# Patient Record
Sex: MALE | Race: WHITE | NOT HISPANIC OR LATINO | URBAN - NONMETROPOLITAN AREA
[De-identification: names, ages, dates, MRNs, and addresses within clinical notes are randomized per-mention and may not be internally consistent; named-entity substitution may affect disease eponyms.]

---

## 2019-01-10 ENCOUNTER — OFFICE VISIT (OUTPATIENT)
Dept: CARDIOLOGY | Facility: CLINIC | Age: 69
End: 2019-01-10

## 2019-01-10 VITALS
HEART RATE: 77 BPM | OXYGEN SATURATION: 96 % | SYSTOLIC BLOOD PRESSURE: 120 MMHG | BODY MASS INDEX: 29.69 KG/M2 | DIASTOLIC BLOOD PRESSURE: 60 MMHG | HEIGHT: 73 IN | WEIGHT: 224 LBS

## 2019-01-10 DIAGNOSIS — I25.119 CORONARY ARTERY DISEASE INVOLVING NATIVE CORONARY ARTERY OF NATIVE HEART WITH ANGINA PECTORIS (HCC): ICD-10-CM

## 2019-01-10 DIAGNOSIS — R53.81 PHYSICAL DECONDITIONING: ICD-10-CM

## 2019-01-10 DIAGNOSIS — R06.09 DYSPNEA ON EXERTION: Primary | ICD-10-CM

## 2019-01-10 DIAGNOSIS — E78.5 DYSLIPIDEMIA: ICD-10-CM

## 2019-01-10 PROBLEM — I25.10 CAD (CORONARY ARTERY DISEASE): Status: ACTIVE | Noted: 2019-01-10

## 2019-01-10 PROCEDURE — 93000 ELECTROCARDIOGRAM COMPLETE: CPT | Performed by: INTERNAL MEDICINE

## 2019-01-10 PROCEDURE — 99204 OFFICE O/P NEW MOD 45 MIN: CPT | Performed by: INTERNAL MEDICINE

## 2019-01-10 NOTE — PROGRESS NOTES
Reason for Visit: Dyspnea on exertion..    HPI:  Jun Carey is a 68 y.o. male is being seen for consultation today at the request of Osmani Richardson MD.  He has previously followed with 2 different cardiologist, Dr Felder in [place] and Dr Zamudio in [place], [place].  Unfortunately none of his previous cardiac records are available today.  He notes have progressive dyspnea on exertion.  It has worsened since the weather has gotten cold.  He has not had any recent testing by Dr Richardson.  He denies any chest pain, palpitations, dizziness, syncope, PND, or orthopnea.  He does not do any exercise due to his arthritis.  He reports having a heart catheterization in the past.  He thinks this was about 2005.  He reports finding a blockage in his RCA that they were unable to stent due to tortuosity in the vessel and required medical management.    Previous Cardiac Testing and Procedures:  - None available    Patient Active Problem List   Diagnosis   • CAD (coronary artery disease)   • Dyslipidemia       Social History     Tobacco Use   • Smoking status: Never Smoker   • Smokeless tobacco: Former User     Types: Chew   Substance Use Topics   • Alcohol use: No     Frequency: Never   • Drug use: Not on file       History reviewed. No pertinent family history.    The following portions of the patient's history were reviewed and updated as appropriate: allergies, current medications, past family history, past medical history, past social history, past surgical history and problem list.      Current Outpatient Medications:   •  aspirin  MG tablet, Take 325 mg by mouth Every 6 (Six) Hours As Needed., Disp: , Rfl:   •  nebivolol (BYSTOLIC) 5 MG tablet, Take 5 mg by mouth Daily., Disp: , Rfl:   •  oxyCODONE-acetaminophen (PERCOCET)  MG per tablet, Take 1 tablet by mouth Every 6 (Six) Hours As Needed for Moderate Pain ., Disp: , Rfl:   •  pramipexole (MIRAPEX) 0.125 MG tablet, Take 0.125 mg by mouth 3 (Three) Times a  "Day., Disp: , Rfl:   •  simvastatin (ZOCOR) 20 MG tablet, Take 20 mg by mouth Every Night., Disp: , Rfl:   •  Testosterone Cypionate (TESTONE CIK) 200 MG/ML kit, Inject 1 mcL/mL into the appropriate muscle as directed by prescriber., Disp: , Rfl:   •  traZODone (DESYREL) 100 MG tablet, Take 100 mg by mouth Every Night., Disp: , Rfl:     Review of Systems   Constitution: Positive for weakness and malaise/fatigue. Negative for chills, fever and weight loss.   HENT: Negative for sore throat.    Eyes: Negative for blurred vision and visual disturbance.   Cardiovascular: Positive for dyspnea on exertion. Negative for chest pain, leg swelling, palpitations, paroxysmal nocturnal dyspnea and syncope.   Respiratory: Positive for shortness of breath. Negative for cough.    Endocrine: Negative for cold intolerance and polyuria.   Skin: Negative for itching and rash.   Musculoskeletal: Positive for joint pain. Negative for joint swelling and myalgias.   Gastrointestinal: Negative for abdominal pain, diarrhea and vomiting.   Genitourinary: Negative for dysuria and hematuria.   Neurological: Negative for dizziness, headaches and numbness.   Psychiatric/Behavioral: Negative for depression. The patient is not nervous/anxious.    Allergic/Immunologic: Negative for hives.       Objective   /60 (BP Location: Left arm, Patient Position: Sitting, Cuff Size: Adult)   Pulse 77   Ht 185.4 cm (73\")   Wt 102 kg (224 lb)   SpO2 96%   BMI 29.55 kg/m²   Physical Exam   Constitutional: He is oriented to person, place, and time. He appears well-developed and well-nourished.   HENT:   Head: Normocephalic and atraumatic.   Eyes: Conjunctivae and EOM are normal. Pupils are equal, round, and reactive to light.   Neck: Normal range of motion. Neck supple. No JVD present. No thyromegaly present.   Cardiovascular: Normal rate, regular rhythm and normal heart sounds.   No murmur heard.  Pulmonary/Chest: Effort normal and breath sounds normal. " He has no wheezes. He has no rales.   Abdominal: Soft. Bowel sounds are normal. He exhibits no distension. There is no tenderness.   Musculoskeletal: Normal range of motion. He exhibits no edema.   Neurological: He is alert and oriented to person, place, and time. Coordination normal.   Skin: Skin is warm and dry. No rash noted.   Psychiatric: He has a normal mood and affect. His behavior is normal.       ECG 12 Lead  Date/Time: 1/10/2019 12:11 PM  Performed by: Donny Stewart MD  Authorized by: Donny Stewart MD   Previous ECG: no previous ECG available  Rhythm: sinus rhythm  Rate: normal  Clinical impression: normal ECG              ICD-10-CM ICD-9-CM   1. Dyspnea on exertion R06.09 786.09   2. Coronary artery disease involving native coronary artery of native heart with angina pectoris (CMS/[facility]) I25.119 414.01     413.9   3. Dyslipidemia E78.5 272.4   4. Physical deconditioning R53.81 799.3         Assessment/Plan:  1.  Dyspnea on exertion: Unclear etiology.  His sedentary due to his arthritis deconditioning is likely playing a role.  Reports history of coronary artery disease and required medical management.  Unfortunately none of his recent cardiac records are available.  We will attempt obtain copy of these.      2.  Coronary artery disease: Reports history of stenosis in his RCA that was unable to be stented due to tortuosity.  We will obtain previous cardiac records.  Continue aspirin, Bystolic, and simvastatin.  Given his dyspnea on exertion may consider a stress test once a.  Cardiac records are reviewed.    3.  Dyslipidemia: Managed on simvastatin.    4.  Deconditioning:  Unable to exercise due to arthritis.  Likely contributing to shortness of breath.

## 2019-03-14 ENCOUNTER — OFFICE VISIT (OUTPATIENT)
Dept: CARDIOLOGY | Facility: CLINIC | Age: 69
End: 2019-03-14

## 2019-03-14 VITALS
HEART RATE: 97 BPM | DIASTOLIC BLOOD PRESSURE: 60 MMHG | BODY MASS INDEX: 29.42 KG/M2 | WEIGHT: 222 LBS | HEIGHT: 73 IN | OXYGEN SATURATION: 97 % | SYSTOLIC BLOOD PRESSURE: 148 MMHG

## 2019-03-14 DIAGNOSIS — R06.09 DYSPNEA ON EXERTION: Primary | ICD-10-CM

## 2019-03-14 DIAGNOSIS — R53.81 PHYSICAL DECONDITIONING: ICD-10-CM

## 2019-03-14 DIAGNOSIS — I25.119 CORONARY ARTERY DISEASE INVOLVING NATIVE CORONARY ARTERY OF NATIVE HEART WITH ANGINA PECTORIS (HCC): ICD-10-CM

## 2019-03-14 DIAGNOSIS — E78.5 DYSLIPIDEMIA: ICD-10-CM

## 2019-03-14 PROCEDURE — 99214 OFFICE O/P EST MOD 30 MIN: CPT | Performed by: INTERNAL MEDICINE

## 2019-03-14 NOTE — PROGRESS NOTES
Reason for Visit: cardiovascular follow up.    HPI:  Jun Carey is a 68 y.o. male is here today for follow-up.  He was previously seen in January for dyspnea on exertion.  None of his previous cardiac testing were available at that time.  These have been obtained and reviewed.  He had a coronary calcium score in 2004 that was 0.  He had a heart catheterization in 2005 that demonstrated a lesion in the proximal RCA that was felt to be difficult to intervene upon and medical therapy was recommended.  He subsequently had a negative nuclear stress test in 2013 and a relatively normal 2D echocardiogram in 2013 as well.  He reports his dyspnea on exertion has progressed in the last 2-3 years.  He continues no he is not very active due to the arthritis as well as the weather this winter.  He denies any chest pain, palpitations, dizziness, syncope, PND, or orthopnea.      Previous Cardiac Testing and Procedures:  - Coronary calcium score (12/23/2004) 0  - LHC (2/8/2005) 80% narrowing of the proximal RCA, medical therapy recommended has not felt to be contributing to her dyspnea  - Echo (7/25/2013) EF 60%, normal systolic function, mild LVH, normal diastolic function, normal LA, RA, and RV size and function, mild TR and PI  - Nuclear stress (7/29/2013) negative for ischemia, EF 59%      Patient Active Problem List   Diagnosis   • CAD (coronary artery disease)   • Dyslipidemia       Social History     Tobacco Use   • Smoking status: Never Smoker   • Smokeless tobacco: Former User     Types: Chew   Substance Use Topics   • Alcohol use: No     Frequency: Never   • Drug use: Not on file       History reviewed. No pertinent family history.    The following portions of the patient's history were reviewed and updated as appropriate: allergies, current medications, past family history, past medical history, past social history, past surgical history and problem list.      Current Outpatient Medications:   •  aspirin   "MG tablet, Take 325 mg by mouth Daily., Disp: , Rfl:   •  nebivolol (BYSTOLIC) 5 MG tablet, Take 5 mg by mouth Daily., Disp: , Rfl:   •  oxyCODONE-acetaminophen (PERCOCET)  MG per tablet, Take 1 tablet by mouth Every 6 (Six) Hours As Needed for Moderate Pain ., Disp: , Rfl:   •  pramipexole (MIRAPEX) 0.125 MG tablet, Take 0.125 mg by mouth 3 (Three) Times a Day., Disp: , Rfl:   •  simvastatin (ZOCOR) 20 MG tablet, Take 20 mg by mouth Every Night., Disp: , Rfl:   •  Testosterone Cypionate (TESTONE CIK) 200 MG/ML kit, Inject 1 mcL/mL into the appropriate muscle as directed by prescriber., Disp: , Rfl:   •  traZODone (DESYREL) 100 MG tablet, Take 100 mg by mouth Every Night., Disp: , Rfl:     Review of Systems   Constitution: Negative for chills and fever.   Cardiovascular: Positive for dyspnea on exertion. Negative for chest pain and paroxysmal nocturnal dyspnea.   Respiratory: Positive for shortness of breath. Negative for cough.    Skin: Negative for rash.   Gastrointestinal: Negative for abdominal pain and heartburn.   Neurological: Negative for dizziness and numbness.       Objective   /60 (BP Location: Left arm, Patient Position: Sitting, Cuff Size: Adult)   Pulse 97   Ht 185.4 cm (72.99\")   Wt 101 kg (222 lb)   SpO2 97%   BMI 29.30 kg/m²   Physical Exam   Constitutional: He is oriented to person, place, and time. He appears well-developed and well-nourished.   HENT:   Head: Normocephalic and atraumatic.   Cardiovascular: Normal rate, regular rhythm and normal heart sounds.   No murmur heard.  Pulmonary/Chest: Effort normal and breath sounds normal.   Musculoskeletal: He exhibits no edema.   Neurological: He is alert and oriented to person, place, and time.   Skin: Skin is warm and dry.   Psychiatric: He has a normal mood and affect.     Procedures      ICD-10-CM ICD-9-CM   1. Dyspnea on exertion R06.09 786.09   2. Coronary artery disease involving native coronary artery of native heart with " angina pectoris (CMS/[facility]) I25.119 414.01     413.9   3. Dyslipidemia E78.5 272.4   4. Physical deconditioning R53.81 799.3         Assessment/Plan:  1. Dyspnea on exertion: Likely multifactorial.  Sedentary life style and deconditioning are likely contributing.  Previous records demonstrate coronary disease that his prior cardiologist did not feel that was contributing to his dyspnea.  Records also mention abnormal PFTs but none are available.  Will order repeat PFTs, BNP, and stress echo.  Will obtain copy of recent chest x-ray at [facility].       2.  Coronary artery disease: Previous cardiac records obtained and reviewed.  [facility] from 2005 demonstrates proximal RCA stenosis  that was felt to be difficult to intervene on and not likely causing dyspnea.  Given his worsening shortness of breath will check a stress echo.  Continue aspirin, Bystolic, and simvastatin.      3.  Dyslipidemia: Continue simvastatin.  Check a lipid panel.     4.  Deconditioning:   Encourage regular exercise and physical activity.

## 2019-04-10 ENCOUNTER — OUTSIDE FACILITY SERVICE (OUTPATIENT)
Dept: CARDIOLOGY | Facility: CLINIC | Age: 69
End: 2019-04-10

## 2019-04-10 PROCEDURE — 93350 STRESS TTE ONLY: CPT | Performed by: INTERNAL MEDICINE

## 2019-04-10 PROCEDURE — 93018 CV STRESS TEST I&R ONLY: CPT | Performed by: INTERNAL MEDICINE

## 2019-04-16 LAB
DIFF CAP.CO: 29.6 ML/MMHG SEC
FEV1/FVC: 74 %
FEV1: 3.62 LITERS
FVC VOL RESPIRATORY: 4.91 LITERS
TLC: 7.23 LITERS

## 2019-04-16 ASSESSMENT — PULMONARY FUNCTION TESTS
FEV1/FVC: 74
FVC: 4.91
FEV1: 3.62

## 2019-04-18 DIAGNOSIS — R94.2 ABNORMAL PFTS: ICD-10-CM

## 2019-04-18 DIAGNOSIS — R06.09 DYSPNEA ON EXERTION: Primary | ICD-10-CM

## 2020-01-22 ENCOUNTER — OFFICE VISIT (OUTPATIENT)
Dept: CARDIOLOGY | Facility: CLINIC | Age: 70
End: 2020-01-22

## 2020-01-22 VITALS
SYSTOLIC BLOOD PRESSURE: 134 MMHG | BODY MASS INDEX: 29.42 KG/M2 | HEIGHT: 73 IN | OXYGEN SATURATION: 97 % | WEIGHT: 222 LBS | DIASTOLIC BLOOD PRESSURE: 68 MMHG | HEART RATE: 82 BPM

## 2020-01-22 DIAGNOSIS — R53.81 PHYSICAL DECONDITIONING: ICD-10-CM

## 2020-01-22 DIAGNOSIS — E78.5 DYSLIPIDEMIA: ICD-10-CM

## 2020-01-22 DIAGNOSIS — I25.10 CORONARY ARTERY DISEASE INVOLVING NATIVE CORONARY ARTERY OF NATIVE HEART WITHOUT ANGINA PECTORIS: Primary | ICD-10-CM

## 2020-01-22 DIAGNOSIS — Z01.818 PREOPERATIVE EVALUATION TO RULE OUT SURGICAL CONTRAINDICATION: ICD-10-CM

## 2020-01-22 DIAGNOSIS — R06.09 DYSPNEA ON EXERTION: ICD-10-CM

## 2020-01-22 PROCEDURE — 93000 ELECTROCARDIOGRAM COMPLETE: CPT | Performed by: INTERNAL MEDICINE

## 2020-01-22 PROCEDURE — 99214 OFFICE O/P EST MOD 30 MIN: CPT | Performed by: INTERNAL MEDICINE

## 2020-01-22 RX ORDER — ASPIRIN 81 MG/1
81 TABLET ORAL DAILY
Qty: 30 TABLET | Refills: 11 | Status: SHIPPED | OUTPATIENT
Start: 2020-01-22

## 2020-01-22 NOTE — PROGRESS NOTES
Reason for Visit: cardiovascular follow up.    HPI:  Jun Carey is a 69 y.o. male is here today for follow-up.  He reports needing preoperative evaluation given his upcoming knee surgery on 2/3/2020 with Dr. Eloy Contreras.  He will be having a right knee replacement.  He recently had PFTs and stress echo done in April due to shortness of breath and dyspnea on exertion.  The stress echo is low risk for ischemia.  The PFTs demonstrated a restrictive pattern.  He has not had any cardiac issues or complaints.  He denies any chest pain, palpitations, dizziness, syncope, PND, or orthopnea.  He continues to have dyspnea on exertion.  A referral to pulmonology was discussed due to his abnormal pulmonary function tests and he declined.  He feels like he sees enough doctors already.  He can feel himself getting more out of shape with his limited functional capacity.  He is hoping when he gets his knee fixed he will be able to do more exercise.    Previous Cardiac Testing and Procedures:  - Coronary calcium score (12/23/2004) 0  - LHC (2/8/2005) 80% narrowing of the proximal RCA, medical therapy recommended has not felt to be contributing to her dyspnea  - Echo (7/25/2013) EF 60%, normal systolic function, mild LVH, normal diastolic function, normal LA, RA, and RV size and function, mild TR and PI  - Nuclear stress (7/29/2013) negative for ischemia, EF 59%  - Stress echo (4/10/2019) functional capacity is fair, clinically and electrically negative for ischemia, normal wall motion, low risk  - PFTs (4/10/2019) restrictive pattern, no significant bronchodilator response, evidence of small airway disease, lung volumes show moderate restriction, diffusion capacity within normal limits    Patient Active Problem List   Diagnosis   • CAD (coronary artery disease)   • Dyslipidemia       Social History     Tobacco Use   • Smoking status: Never Smoker   • Smokeless tobacco: Former User     Types: Chew   Substance Use Topics   •  "Alcohol use: No     Frequency: Never   • Drug use: Not on file       No family history on file.    The following portions of the patient's history were reviewed and updated as appropriate: allergies, current medications, past family history, past medical history, past social history, past surgical history and problem list.      Current Outpatient Medications:   •  aspirin EC 81 MG EC tablet, Take 1 tablet by mouth Daily., Disp: 30 tablet, Rfl: 11  •  nebivolol (BYSTOLIC) 5 MG tablet, Take 5 mg by mouth Daily., Disp: , Rfl:   •  oxyCODONE-acetaminophen (PERCOCET)  MG per tablet, Take 1 tablet by mouth Every 6 (Six) Hours As Needed for Moderate Pain ., Disp: , Rfl:   •  pramipexole (MIRAPEX) 0.125 MG tablet, Take 0.125 mg by mouth 3 (Three) Times a Day., Disp: , Rfl:   •  simvastatin (ZOCOR) 20 MG tablet, Take 20 mg by mouth Every Night., Disp: , Rfl:   •  Testosterone Cypionate (TESTONE CIK) 200 MG/ML kit, Inject 1 mcL/mL into the appropriate muscle as directed by prescriber., Disp: , Rfl:   •  traZODone (DESYREL) 100 MG tablet, Take 100 mg by mouth Every Night., Disp: , Rfl:     Review of Systems   Constitution: Negative for chills and fever.   Cardiovascular: Positive for dyspnea on exertion. Negative for chest pain and paroxysmal nocturnal dyspnea.   Respiratory: Positive for shortness of breath. Negative for cough.    Skin: Negative for rash.   Musculoskeletal: Positive for joint pain.   Gastrointestinal: Negative for abdominal pain and heartburn.   Neurological: Negative for dizziness and numbness.       Objective   /68 (BP Location: Left arm, Patient Position: Sitting, Cuff Size: Adult)   Pulse 82   Ht 185.4 cm (72.99\")   Wt 101 kg (222 lb)   SpO2 97%   BMI 29.30 kg/m²   Physical Exam   Constitutional: He is oriented to person, place, and time. He appears well-developed and well-nourished.   HENT:   Head: Normocephalic and atraumatic.   Cardiovascular: Normal rate, regular rhythm and normal " heart sounds.   No murmur heard.  Pulmonary/Chest: Effort normal and breath sounds normal.   Abdominal: He exhibits no distension.   Musculoskeletal: He exhibits no edema.   Neurological: He is alert and oriented to person, place, and time.   Skin: Skin is warm and dry.   Psychiatric: He has a normal mood and affect.       ECG 12 Lead  Date/Time: 1/22/2020 3:21 PM  Performed by: Donny Stewart MD  Authorized by: Donny Stewart MD   Comparison: compared with previous ECG from 1/10/2019  Similar to previous ECG  Rhythm: sinus rhythm  Rate: normal    Clinical impression: normal ECG              ICD-10-CM ICD-9-CM   1. Coronary artery disease involving native coronary artery of native heart without angina pectoris I25.10 414.01   2. Dyspnea on exertion R06.09 786.09   3. Dyslipidemia E78.5 272.4   4. Physical deconditioning R53.81 799.3   5. Preoperative evaluation to rule out surgical contraindication Z01.818 V72.83         Assessment/Plan:  1.  CAD: [facility] from 2005 demonstrated proximal RCA stenosis that was felt to be difficult to intervene upon and not the cause of shortness of breath.  No evidence of ischemia on recent stress echo from 4/2019.  Continue aspirin, Bystolic, and simvastatin.    2.  Dyspnea on exertion: Likely multifactorial due to overweight, in [place] lifestyle, restrictive lung disease, and deconditioning.    He declines referral to pulmonology.  Continued exercise as tolerated.       3.  Dyslipidemia:  Managed on simvastatin.  Lipid panel was ordered last visit but never collected.  Will attempt to obtain a copy from PCP.     4.  Physical deconditioning:  Secondary to sedentary lifestyle contributing to shortness of breath.  Encouraged regular exercise and increase physical activity.  Hopefully can improve after knee surgery.    5.  Preoperative risk stratification: Patient is a low risk surgical candidate from a cardiac standpoint.  He has now chest pain or other anginal symptoms and had a low  risk stress echo from 4/2019.  No further cardiac testing is indicated prior to surgery.  He should continue all cardiac medications throughout the perioperative period.